# Patient Record
Sex: MALE | URBAN - METROPOLITAN AREA
[De-identification: names, ages, dates, MRNs, and addresses within clinical notes are randomized per-mention and may not be internally consistent; named-entity substitution may affect disease eponyms.]

---

## 2021-06-29 ENCOUNTER — EMERGENCY (EMERGENCY)
Facility: HOSPITAL | Age: 56
LOS: 1 days | Discharge: HOME | End: 2021-06-29
Attending: EMERGENCY MEDICINE | Admitting: EMERGENCY MEDICINE
Payer: SELF-PAY

## 2021-06-29 VITALS
TEMPERATURE: 98 F | RESPIRATION RATE: 18 BRPM | SYSTOLIC BLOOD PRESSURE: 136 MMHG | OXYGEN SATURATION: 100 % | HEART RATE: 63 BPM

## 2021-06-29 VITALS
TEMPERATURE: 98 F | SYSTOLIC BLOOD PRESSURE: 132 MMHG | RESPIRATION RATE: 16 BRPM | OXYGEN SATURATION: 100 % | HEART RATE: 67 BPM | DIASTOLIC BLOOD PRESSURE: 76 MMHG

## 2021-06-29 PROCEDURE — 93971 EXTREMITY STUDY: CPT | Mod: 26,LT

## 2021-06-29 PROCEDURE — 99284 EMERGENCY DEPT VISIT MOD MDM: CPT

## 2021-06-29 NOTE — ED PROVIDER NOTE - OBJECTIVE STATEMENT
55 y/o male no pmh, not on any AC, p/w c/o left lower thigh pain, staes that a heavy metal object fell against his leg 4 days ago, has had swelling, pain since then, went to an urgent care today and was told to come to the ER to r/o a dvt, pt denies any HA, neck pain, cough, f/c/n/v/d/c, hest pain, sob, abdominal pain, urinary symptoms, numbness/weaknesss/tingling, recent travel, sick contact, social history 55 y/o male no pmh, not on any AC, p/w c/o left lower thigh pain, staes that a heavy metal object fell against his leg 4 days ago, has had swelling, pain since then, went to an urgent care today and was told to come to the ER to r/o a dvt, pt denies any HA, neck pain, cough, f/c/n/v/d/c, hest pain, sob, abdominal pain, urinary symptoms, numbness/weaknesss/tingling, recent travel, sick contact, social history    Attendinyo male presents with left lower leg pain and swelling.  had large metal object fall on the leg 4 days ago.  able to walk without difficulty.  no shortness of breath or fatigue.

## 2021-06-29 NOTE — ED PROVIDER NOTE - PATIENT PORTAL LINK FT
You can access the FollowMyHealth Patient Portal offered by [facility] by registering at the following website: [email or web address]. By joining [employer]’s FollowMyHealth portal, you will also be able to view your health information using other applications (apps) compatible with our system.

## 2021-06-29 NOTE — ED PROVIDER NOTE - PHYSICAL EXAMINATION
left lower thigh + large hematoma, + mildly tender, + swollen, no deformity, + full rom, + able to walk

## 2021-06-29 NOTE — ED ADULT TRIAGE NOTE - CHIEF COMPLAINT QUOTE
sent by urgent care for DVT eval. Complaining of mild pain in left thigh. Mild swelling observed to left thigh. Respirations even and unlabored. Comfortable appearance. Denies falls/trauma to leg.

## 2021-06-29 NOTE — ED PROVIDER NOTE - CLINICAL SUMMARY MEDICAL DECISION MAKING FREE TEXT BOX
swelling + echymosis too left lower thigh s/p trauma 4 days ago, consistent with hematoma, will get u/s, reasses

## 2022-06-03 PROBLEM — Z78.9 OTHER SPECIFIED HEALTH STATUS: Chronic | Status: ACTIVE | Noted: 2021-06-29

## 2022-08-26 ENCOUNTER — APPOINTMENT (OUTPATIENT)
Dept: GASTROENTEROLOGY | Facility: CLINIC | Age: 57
End: 2022-08-26

## 2024-04-22 ENCOUNTER — OUTPATIENT (OUTPATIENT)
Dept: OUTPATIENT SERVICES | Facility: HOSPITAL | Age: 59
LOS: 1 days | End: 2024-04-22
Payer: COMMERCIAL

## 2024-04-22 ENCOUNTER — APPOINTMENT (OUTPATIENT)
Dept: INTERNAL MEDICINE | Facility: CLINIC | Age: 59
End: 2024-04-22
Payer: MEDICAID

## 2024-04-22 VITALS
BODY MASS INDEX: 34.16 KG/M2 | HEART RATE: 75 BPM | SYSTOLIC BLOOD PRESSURE: 140 MMHG | DIASTOLIC BLOOD PRESSURE: 80 MMHG | HEIGHT: 71 IN | OXYGEN SATURATION: 97 % | WEIGHT: 244 LBS

## 2024-04-22 DIAGNOSIS — I10 ESSENTIAL (PRIMARY) HYPERTENSION: ICD-10-CM

## 2024-04-22 DIAGNOSIS — E03.9 HYPOTHYROIDISM, UNSPECIFIED: ICD-10-CM

## 2024-04-22 DIAGNOSIS — E66.9 OBESITY, UNSPECIFIED: ICD-10-CM

## 2024-04-22 DIAGNOSIS — Z00.00 ENCOUNTER FOR GENERAL ADULT MEDICAL EXAMINATION W/OUT ABNORMAL FINDINGS: ICD-10-CM

## 2024-04-22 PROCEDURE — 83036 HEMOGLOBIN GLYCOSYLATED A1C: CPT

## 2024-04-22 PROCEDURE — 85027 COMPLETE CBC AUTOMATED: CPT

## 2024-04-22 PROCEDURE — 99204 OFFICE O/P NEW MOD 45 MIN: CPT | Mod: GC

## 2024-04-22 PROCEDURE — 84443 ASSAY THYROID STIM HORMONE: CPT

## 2024-04-22 PROCEDURE — G0463: CPT

## 2024-04-22 PROCEDURE — 80053 COMPREHEN METABOLIC PANEL: CPT

## 2024-04-22 PROCEDURE — 80061 LIPID PANEL: CPT

## 2024-04-22 NOTE — PHYSICAL EXAM
[No Acute Distress] : no acute distress [Well Nourished] : well nourished [Well Developed] : well developed [No Respiratory Distress] : no respiratory distress  [No Accessory Muscle Use] : no accessory muscle use [Clear to Auscultation] : lungs were clear to auscultation bilaterally [Normal Rate] : normal rate  [Regular Rhythm] : with a regular rhythm [Normal S1, S2] : normal S1 and S2 [Soft] : abdomen soft [Non Tender] : non-tender [Non-distended] : non-distended [Normal Bowel Sounds] : normal bowel sounds [No Rash] : no rash

## 2024-04-23 PROBLEM — E66.9 OBESITY (BMI 30.0-34.9): Status: ACTIVE | Noted: 2024-04-22

## 2024-04-23 PROBLEM — E03.9 HYPOTHYROIDISM, UNSPECIFIED TYPE: Status: ACTIVE | Noted: 2024-04-22

## 2024-04-23 NOTE — ASSESSMENT
[FreeTextEntry1] : 59M w PMH Hypothyroidism presenting to establish care. No acute complaints.  #Obesity  - Patient exercises however has poor diet (eats out a lot). Has noticed weight gain recently.  - Has remote history of hypothyroidism however not on medications  - Reinforce the importance of exercise and healthy eating habits, and promote weight loss. Patient understood  #HCM  - Declined TDAP, Shingles vaccine  - Has COVID vaccine x2  - Check cmp, lipid, a1c , tsh  - FIT test provided. Patient declined colonoscopy   D/w Dr. Joy

## 2024-04-23 NOTE — HISTORY OF PRESENT ILLNESS
[FreeTextEntry1] : 59M w Hypothyroidism presenting to [facility] care. [de-identified] : 59M w Hypothyroidism presenting to establish care. No acute complaints. Patient lives at home with female partner. 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 He exercises routinely except past few weeks he was on vacation. He has noticed his weight has increased recently. He was taken off his synthroid medication +5 years ago. NKDA. Takes no medications at home. No past surgical hx, no significant family Hx. His sister had a complication post-colonoscopy and patient would like to avoid colonoscopy. Denies fever, chills, sob, cp, abd pain, n/v, diarrhea/constipation.

## 2024-04-25 LAB
ALBUMIN SERPL ELPH-MCNC: 4.8 G/DL
ALP BLD-CCNC: 111 U/L
ALT SERPL-CCNC: 26 U/L
ANION GAP SERPL CALC-SCNC: 13 MMOL/L
AST SERPL-CCNC: 17 U/L
BILIRUB SERPL-MCNC: 0.4 MG/DL
BUN SERPL-MCNC: 14 MG/DL
CALCIUM SERPL-MCNC: 9.3 MG/DL
CHLORIDE SERPL-SCNC: 106 MMOL/L
CHOLEST SERPL-MCNC: 199 MG/DL
CO2 SERPL-SCNC: 24 MMOL/L
CREAT SERPL-MCNC: 1.11 MG/DL
EGFR: 76 ML/MIN/1.73M2
ESTIMATED AVERAGE GLUCOSE: 117 MG/DL
GLUCOSE SERPL-MCNC: 97 MG/DL
HBA1C MFR BLD HPLC: 5.7 %
HCT VFR BLD CALC: 53 %
HDLC SERPL-MCNC: 39 MG/DL
HGB BLD-MCNC: 16.9 G/DL
LDLC SERPL CALC-MCNC: 129 MG/DL
MCHC RBC-ENTMCNC: 26.9 PG
MCHC RBC-ENTMCNC: 31.9 GM/DL
MCV RBC AUTO: 84.4 FL
NONHDLC SERPL-MCNC: 160 MG/DL
PLATELET # BLD AUTO: 278 K/UL
POTASSIUM SERPL-SCNC: 4.3 MMOL/L
PROT SERPL-MCNC: 7.3 G/DL
RBC # BLD: 6.28 M/UL
RBC # FLD: 13.7 %
SODIUM SERPL-SCNC: 143 MMOL/L
TRIGL SERPL-MCNC: 169 MG/DL
TSH SERPL-ACNC: 0.91 UIU/ML
WBC # FLD AUTO: 7.86 K/UL

## 2024-04-30 DIAGNOSIS — E03.9 HYPOTHYROIDISM, UNSPECIFIED: ICD-10-CM

## 2024-10-17 ENCOUNTER — APPOINTMENT (OUTPATIENT)
Dept: INTERNAL MEDICINE | Facility: CLINIC | Age: 59
End: 2024-10-17

## 2024-10-23 ENCOUNTER — APPOINTMENT (OUTPATIENT)
Dept: INTERNAL MEDICINE | Facility: CLINIC | Age: 59
End: 2024-10-23

## 2025-09-12 ENCOUNTER — APPOINTMENT (OUTPATIENT)
Dept: INTERNAL MEDICINE | Facility: CLINIC | Age: 60
End: 2025-09-12
Payer: SELF-PAY

## 2025-09-12 VITALS
BODY MASS INDEX: 34.3 KG/M2 | SYSTOLIC BLOOD PRESSURE: 122 MMHG | OXYGEN SATURATION: 98 % | WEIGHT: 245 LBS | DIASTOLIC BLOOD PRESSURE: 70 MMHG | HEIGHT: 71 IN | HEART RATE: 71 BPM

## 2025-09-12 DIAGNOSIS — Z00.00 ENCOUNTER FOR GENERAL ADULT MEDICAL EXAMINATION W/OUT ABNORMAL FINDINGS: ICD-10-CM

## 2025-09-12 DIAGNOSIS — E78.5 HYPERLIPIDEMIA, UNSPECIFIED: ICD-10-CM

## 2025-09-12 DIAGNOSIS — E66.811 OBESITY, CLASS 1: ICD-10-CM

## 2025-09-12 PROCEDURE — 99386 PREV VISIT NEW AGE 40-64: CPT | Mod: GC

## 2025-09-12 RX ORDER — METFORMIN HYDROCHLORIDE 500 MG/1
500 TABLET, EXTENDED RELEASE ORAL TWICE DAILY
Qty: 60 | Refills: 3 | Status: ACTIVE | COMMUNITY
Start: 2025-09-12 | End: 1900-01-01

## 2025-09-13 PROBLEM — E66.811 OBESITY (BMI 30.0-34.9): Status: ACTIVE | Noted: 2024-04-22

## 2025-09-15 PROBLEM — E78.5 HLD (HYPERLIPIDEMIA): Status: ACTIVE | Noted: 2025-09-15

## 2025-09-15 LAB
ALBUMIN SERPL ELPH-MCNC: 4.9 G/DL
ALP BLD-CCNC: 97 U/L
ALT SERPL-CCNC: 36 U/L
ANION GAP SERPL CALC-SCNC: 16 MMOL/L
AST SERPL-CCNC: 27 U/L
BASOPHILS # BLD AUTO: 0.05 K/UL
BASOPHILS NFR BLD AUTO: 0.8 %
BILIRUB SERPL-MCNC: 0.3 MG/DL
BUN SERPL-MCNC: 18 MG/DL
CALCIUM SERPL-MCNC: 9.6 MG/DL
CHLORIDE SERPL-SCNC: 108 MMOL/L
CHOLEST SERPL-MCNC: 196 MG/DL
CO2 SERPL-SCNC: 20 MMOL/L
CREAT SERPL-MCNC: 1.09 MG/DL
EGFRCR SERPLBLD CKD-EPI 2021: 77 ML/MIN/1.73M2
EOSINOPHIL # BLD AUTO: 0.08 K/UL
EOSINOPHIL NFR BLD AUTO: 1.2 %
ESTIMATED AVERAGE GLUCOSE: 123 MG/DL
GLUCOSE SERPL-MCNC: 91 MG/DL
HBA1C MFR BLD HPLC: 5.9 %
HCT VFR BLD CALC: 52.1 %
HDLC SERPL-MCNC: 37 MG/DL
HGB BLD-MCNC: 16.7 G/DL
IMM GRANULOCYTES NFR BLD AUTO: 0.8 %
LDLC SERPL-MCNC: 144 MG/DL
LYMPHOCYTES # BLD AUTO: 1.98 K/UL
LYMPHOCYTES NFR BLD AUTO: 30 %
MAN DIFF?: NORMAL
MCHC RBC-ENTMCNC: 26.6 PG
MCHC RBC-ENTMCNC: 32.1 G/DL
MCV RBC AUTO: 83 FL
MONOCYTES # BLD AUTO: 0.57 K/UL
MONOCYTES NFR BLD AUTO: 8.6 %
NEUTROPHILS # BLD AUTO: 3.86 K/UL
NEUTROPHILS NFR BLD AUTO: 58.6 %
NONHDLC SERPL-MCNC: 160 MG/DL
PLATELET # BLD AUTO: 289 K/UL
POTASSIUM SERPL-SCNC: 5 MMOL/L
PROT SERPL-MCNC: 7.4 G/DL
RBC # BLD: 6.28 M/UL
RBC # FLD: 13.9 %
SODIUM SERPL-SCNC: 144 MMOL/L
TRIGL SERPL-MCNC: 88 MG/DL
TSH SERPL-ACNC: 0.5 UIU/ML
WBC # FLD AUTO: 6.59 K/UL

## 2025-09-15 RX ORDER — ATORVASTATIN CALCIUM 40 MG/1
40 TABLET, FILM COATED ORAL
Qty: 1 | Refills: 3 | Status: ACTIVE | COMMUNITY
Start: 2025-09-15 | End: 1900-01-01